# Patient Record
Sex: MALE | Race: WHITE | NOT HISPANIC OR LATINO | URBAN - METROPOLITAN AREA
[De-identification: names, ages, dates, MRNs, and addresses within clinical notes are randomized per-mention and may not be internally consistent; named-entity substitution may affect disease eponyms.]

---

## 2017-01-02 ENCOUNTER — COMMUNICATION - HEALTHEAST (OUTPATIENT)
Dept: FAMILY MEDICINE | Facility: CLINIC | Age: 82
End: 2017-01-02

## 2017-01-02 DIAGNOSIS — I10 ESSENTIAL HYPERTENSION: ICD-10-CM

## 2017-01-02 DIAGNOSIS — N28.9 DISORDER OF KIDNEY AND URETER: ICD-10-CM

## 2017-01-05 ENCOUNTER — COMMUNICATION - HEALTHEAST (OUTPATIENT)
Dept: FAMILY MEDICINE | Facility: CLINIC | Age: 82
End: 2017-01-05

## 2017-01-05 DIAGNOSIS — N28.9 DISORDER OF KIDNEY AND URETER: ICD-10-CM

## 2017-01-05 DIAGNOSIS — I10 ESSENTIAL HYPERTENSION: ICD-10-CM

## 2017-05-24 ENCOUNTER — OFFICE VISIT - HEALTHEAST (OUTPATIENT)
Dept: FAMILY MEDICINE | Facility: CLINIC | Age: 82
End: 2017-05-24

## 2017-05-24 DIAGNOSIS — J32.9 SINUSITIS: ICD-10-CM

## 2017-05-24 DIAGNOSIS — I10 ESSENTIAL HYPERTENSION: ICD-10-CM

## 2017-05-24 DIAGNOSIS — N28.9 DISORDER OF KIDNEY AND URETER: ICD-10-CM

## 2017-05-24 ASSESSMENT — MIFFLIN-ST. JEOR: SCORE: 1194.91

## 2017-06-01 ENCOUNTER — OFFICE VISIT - HEALTHEAST (OUTPATIENT)
Dept: FAMILY MEDICINE | Facility: CLINIC | Age: 82
End: 2017-06-01

## 2017-06-01 DIAGNOSIS — I10 ESSENTIAL HYPERTENSION WITH GOAL BLOOD PRESSURE LESS THAN 140/90: ICD-10-CM

## 2017-06-01 DIAGNOSIS — N28.9 DISORDER OF KIDNEY AND URETER: ICD-10-CM

## 2017-06-01 DIAGNOSIS — Z00.00 ROUTINE GENERAL MEDICAL EXAMINATION AT A HEALTH CARE FACILITY: ICD-10-CM

## 2017-06-01 DIAGNOSIS — J01.90 ACUTE SINUSITIS: ICD-10-CM

## 2017-06-01 DIAGNOSIS — G62.9 PERIPHERAL NEUROPATHY: ICD-10-CM

## 2017-06-19 ENCOUNTER — COMMUNICATION - HEALTHEAST (OUTPATIENT)
Dept: FAMILY MEDICINE | Facility: CLINIC | Age: 82
End: 2017-06-19

## 2017-06-19 DIAGNOSIS — N28.9 DISORDER OF KIDNEY AND URETER: ICD-10-CM

## 2017-06-19 DIAGNOSIS — I10 ESSENTIAL HYPERTENSION: ICD-10-CM

## 2017-06-21 ENCOUNTER — COMMUNICATION - HEALTHEAST (OUTPATIENT)
Dept: FAMILY MEDICINE | Facility: CLINIC | Age: 82
End: 2017-06-21

## 2017-06-21 DIAGNOSIS — I10 ESSENTIAL HYPERTENSION: ICD-10-CM

## 2017-06-21 DIAGNOSIS — N28.9 DISORDER OF KIDNEY AND URETER: ICD-10-CM

## 2017-07-06 ENCOUNTER — OFFICE VISIT - HEALTHEAST (OUTPATIENT)
Dept: FAMILY MEDICINE | Facility: CLINIC | Age: 82
End: 2017-07-06

## 2017-07-06 DIAGNOSIS — N28.9 DISORDER OF KIDNEY AND URETER: ICD-10-CM

## 2017-07-06 DIAGNOSIS — I10 ESSENTIAL HYPERTENSION WITH GOAL BLOOD PRESSURE LESS THAN 140/90: ICD-10-CM

## 2017-07-06 DIAGNOSIS — J30.9 ALLERGIC RHINITIS: ICD-10-CM

## 2017-07-28 ENCOUNTER — OFFICE VISIT - HEALTHEAST (OUTPATIENT)
Dept: FAMILY MEDICINE | Facility: CLINIC | Age: 82
End: 2017-07-28

## 2017-07-28 DIAGNOSIS — I10 ESSENTIAL HYPERTENSION WITH GOAL BLOOD PRESSURE LESS THAN 140/90: ICD-10-CM

## 2017-07-28 DIAGNOSIS — N40.0 BPH (BENIGN PROSTATIC HYPERPLASIA): ICD-10-CM

## 2017-07-28 DIAGNOSIS — N28.9 DISORDER OF KIDNEY AND URETER: ICD-10-CM

## 2017-08-07 ENCOUNTER — COMMUNICATION - HEALTHEAST (OUTPATIENT)
Dept: FAMILY MEDICINE | Facility: CLINIC | Age: 82
End: 2017-08-07

## 2017-08-07 DIAGNOSIS — I10 ESSENTIAL HYPERTENSION: ICD-10-CM

## 2017-08-07 DIAGNOSIS — N28.9 DISORDER OF KIDNEY AND URETER: ICD-10-CM

## 2018-09-05 ENCOUNTER — OFFICE VISIT - HEALTHEAST (OUTPATIENT)
Dept: FAMILY MEDICINE | Facility: CLINIC | Age: 83
End: 2018-09-05

## 2018-09-05 DIAGNOSIS — N40.0 BPH (BENIGN PROSTATIC HYPERPLASIA): ICD-10-CM

## 2018-09-05 DIAGNOSIS — Z00.00 ROUTINE GENERAL MEDICAL EXAMINATION AT A HEALTH CARE FACILITY: ICD-10-CM

## 2018-09-05 DIAGNOSIS — N28.9 DISORDER OF KIDNEY AND URETER: ICD-10-CM

## 2018-09-05 DIAGNOSIS — I10 ESSENTIAL HYPERTENSION WITH GOAL BLOOD PRESSURE LESS THAN 140/90: ICD-10-CM

## 2018-09-05 LAB
ALBUMIN SERPL-MCNC: 3.5 G/DL (ref 3.5–5)
ALP SERPL-CCNC: 93 U/L (ref 45–120)
ALT SERPL W P-5'-P-CCNC: 11 U/L (ref 0–45)
ANION GAP SERPL CALCULATED.3IONS-SCNC: 9 MMOL/L (ref 5–18)
AST SERPL W P-5'-P-CCNC: 14 U/L (ref 0–40)
BILIRUB SERPL-MCNC: 0.4 MG/DL (ref 0–1)
BUN SERPL-MCNC: 20 MG/DL (ref 8–28)
CALCIUM SERPL-MCNC: 8.9 MG/DL (ref 8.5–10.5)
CHLORIDE BLD-SCNC: 106 MMOL/L (ref 98–107)
CO2 SERPL-SCNC: 25 MMOL/L (ref 22–31)
CREAT SERPL-MCNC: 1.77 MG/DL (ref 0.7–1.3)
ERYTHROCYTE [DISTWIDTH] IN BLOOD BY AUTOMATED COUNT: 13.5 % (ref 11–14.5)
GFR SERPL CREATININE-BSD FRML MDRD: 36 ML/MIN/1.73M2
GLUCOSE BLD-MCNC: 72 MG/DL (ref 70–125)
HCT VFR BLD AUTO: 43.9 % (ref 40–54)
HGB BLD-MCNC: 14.2 G/DL (ref 14–18)
MCH RBC QN AUTO: 28.5 PG (ref 27–34)
MCHC RBC AUTO-ENTMCNC: 32.3 G/DL (ref 32–36)
MCV RBC AUTO: 88 FL (ref 80–100)
PLATELET # BLD AUTO: 303 THOU/UL (ref 140–440)
PMV BLD AUTO: 6.8 FL (ref 7–10)
POTASSIUM BLD-SCNC: 4.2 MMOL/L (ref 3.5–5)
PROT SERPL-MCNC: 7 G/DL (ref 6–8)
RBC # BLD AUTO: 4.98 MILL/UL (ref 4.4–6.2)
SODIUM SERPL-SCNC: 140 MMOL/L (ref 136–145)
WBC: 6.8 THOU/UL (ref 4–11)

## 2018-09-05 RX ORDER — ATENOLOL 25 MG/1
25 TABLET ORAL DAILY
Qty: 90 TABLET | Refills: 3 | Status: SHIPPED | OUTPATIENT
Start: 2018-09-05

## 2018-09-05 ASSESSMENT — MIFFLIN-ST. JEOR: SCORE: 1247.29

## 2018-09-06 ENCOUNTER — COMMUNICATION - HEALTHEAST (OUTPATIENT)
Dept: FAMILY MEDICINE | Facility: CLINIC | Age: 83
End: 2018-09-06

## 2018-09-28 ENCOUNTER — OFFICE VISIT - HEALTHEAST (OUTPATIENT)
Dept: FAMILY MEDICINE | Facility: CLINIC | Age: 83
End: 2018-09-28

## 2018-09-28 DIAGNOSIS — I10 ESSENTIAL HYPERTENSION: ICD-10-CM

## 2021-05-27 ENCOUNTER — RECORDS - HEALTHEAST (OUTPATIENT)
Dept: ADMINISTRATIVE | Facility: CLINIC | Age: 86
End: 2021-05-27

## 2021-05-31 VITALS — HEIGHT: 63 IN | WEIGHT: 145 LBS | BODY MASS INDEX: 25.69 KG/M2

## 2021-05-31 VITALS — WEIGHT: 146 LBS | BODY MASS INDEX: 26.28 KG/M2

## 2021-05-31 VITALS — BODY MASS INDEX: 26.28 KG/M2 | WEIGHT: 146 LBS

## 2021-05-31 VITALS — BODY MASS INDEX: 25.83 KG/M2 | WEIGHT: 143.5 LBS

## 2021-06-02 VITALS — WEIGHT: 153.05 LBS | BODY MASS INDEX: 26.13 KG/M2 | HEIGHT: 64 IN

## 2021-06-02 VITALS — BODY MASS INDEX: 26.68 KG/M2 | WEIGHT: 153 LBS

## 2021-06-10 NOTE — PROGRESS NOTES
Subjective:    Gustavo Montoya is a 88 y.o. male who presents for evaluation of chest and nasal congestion.  He has been not feeling well for 7 days.  He has chest congestion, runny nose, and significant nasal congestion.  He says sometimes it is quite difficult to breathe out of his nose.  Slight intermittent cough.  He has been taking Waldryl for the past several days.  Initially it helped a fair amount, now it is not helping much.  He has had some sneezing, mild headache.  No sinus or facial pain.  No fevers.  No known sick contacts.    He has some hearing loss.  His last visit was in 2015.  He reports he has been pretty healthy since then.  He is taking atenolol and Flomax.  He says he is actually taking atenolol twice a day, once at 4 AM and once at 9 AM.  He is due for follow-up visit with PCP for physical and medication management.    Patient Active Problem List   Diagnosis     Hyperlipidemia     Cataract     Transient Ischemic Attack     Acute Bronchitis     Eustachian Tube Dysfunction     Hearing Loss     Essential Hypertension     Renal Insufficiency     Benign Prostatic Hypertrophy     Palpitations     Backache       Current Outpatient Prescriptions:      aspirin 81 MG EC tablet, Take 1 tablet (81 mg total) by mouth daily., Disp: 100 tablet, Rfl: 2     atenolol (TENORMIN) 25 MG tablet, TAKE ONE TABLET BY MOUTH ONCE DAILY (Patient taking differently: TAKE ONE TABLET BY MOUTH TWICE DAILY), Disp: 90 tablet, Rfl: 0     amoxicillin (AMOXIL) 500 MG tablet, Take 1 tablet (500 mg total) by mouth 2 (two) times a day for 10 days., Disp: 20 tablet, Rfl: 0     tamsulosin (FLOMAX) 0.4 mg Cp24, Take 1 capsule (0.4 mg total) by mouth daily after supper., Disp: 90 capsule, Rfl: 3     Objective:   Allergies:  Review of patient's allergies indicates no known allergies.    Vitals:  Vitals:    05/24/17 1402   BP: 130/64   Pulse: 66   Resp: 22   Temp: 97.8  F (36.6  C)     Body mass index is 26.1 kg/(m^2).    Vital signs  reviewed.  General: Patient is alert and oriented x 3, in no apparent distress  Ears: TMs are non-erythematous with good light reflex bilaterally  Throat: no erythema, edema or exudate noted  Lymphatic: no anterior cervical lymph node enlargement  Cardiac: regular rate and rhythm, no murmurs  Pulmonary: lungs clear to auscultation bilaterally, no crackles, rales, rhonchi, or wheezing noted    Results for orders placed or performed in visit on 05/24/17   HM2(CBC w/o Differential)   Result Value Ref Range    WBC 9.2 4.0 - 11.0 thou/uL    RBC 4.77 4.40 - 6.20 mill/uL    Hemoglobin 13.9 (L) 14.0 - 18.0 g/dL    Hematocrit 41.6 40.0 - 54.0 %    MCV 87 80 - 100 fL    MCH 29.1 27.0 - 34.0 pg    MCHC 33.4 32.0 - 36.0 g/dL    RDW 12.4 11.0 - 14.5 %    Platelets 357 140 - 440 thou/uL    MPV 7.3 7.0 - 10.0 fL    BMP pending.    Assessment and Plan:   1.  Sinusitis.  Prescription sent for amoxicillin, 500 mg twice daily for 10 days.  He has a history of some renal insufficiency.  Last GFR from 10/20/2015 was 36.  As a precaution, I will use renal dosing of amoxicillin as stated above.  I recommended he try to wean off the Waldryl and he will do this.  He will continue to monitor symptoms and keep us informed as to how he is doing.    2.  Healthcare maintenance.  He has a physical scheduled with Dr. Kiser next week.    This dictation uses voice recognition software, which may contain typographical errors.

## 2021-06-11 NOTE — PROGRESS NOTES
Hm  Seen recently for sinusitis  follow up chronic issues  New concern wt loss    Allergies nasal dripping and coughing phlegm.  No fever.  Breathe is okay.  Cannot lay with head back as it causes cough.  Needs to lean forward.  12 days.   Could only lay down for 3 hours as the phlegm builds up.  It tickles.  Aggravating.    Sinuses are congested and drippin.   Ears are alright.    On asa and hypertension med.  No other pills.     No bleeding  Normal stools  Nl urine    Was given amox.  Took three pills.  It loosened things up but it leaves you so high strung.  Up tight.  Hard to explain.  Oh my god.  Hard to breathe.  Then i'd cough.    Lost my pot belly due to poor appetite due to above.  Did not eat for five days.    Past hx and fh and fhsh same.  Still gigs 2/wk.  Will stand for hours    Hypertension denies chest pain or headache.  Renal insufficiency denies nausea or vomiting        ROS:  Constitutional: denies fever  Vision: denies change in vision  ENT: above cough  Resp: denies shortness of breath  Card: denies palpitations  GI: denies vomiting or abnormal stool, melena, hematochezia  : denies dysuria  Neuro: denies weakness   intermittent left medial forefoot numbness with prolong standing at gigs.  Some sx at night after long gig  Derm: denies rash  Joints: denies redness or swelling  Endo: denies polyuria  Mental health: mood is good  Extremities: no edema  Otherwise negative review of systems     ROS: as noted above    OBJECTIVE:   Vitals:    06/01/17 1411   BP: 144/74   Pulse: 60   Resp: 16   Temp: 97.4  F (36.3  C)      Head: atraumatic   Eyes: nl eom, anicteric   Ears: nl external ears tms    Pt [place]  Neck: nl nodes, supple   Lungs: clear to ausc   Heart: regular rhythm  Back: no tenderness  Abd: soft nontender   Joints: uninflamed   Ext: nontender calves   Mental: euthymic  Neuro: no weakness  Gait: slow rising and gait.  Independent without device      ASSESSMENT/PLAN:   Health Maintenance/ Plan:  anticipatory guidance, discussion of risk factors, lifestyle modification, and risk factor management. For children age 12 months to adulthood verbal dental referral is given and discussed benefits and risks of FVA and obtained verbal with each application.    Additional diagnoses and related orders:  1. Routine general medical examination at a health care facility     2. Acute sinusitis  fluticasone (FLONASE) 50 mcg/actuation nasal spray   3. Essential hypertension with goal blood pressure less than 140/90     4. Renal Insufficiency     5. 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  Labs done recently  follow up couple wks for wt, bp, cough, foot sxs

## 2021-06-11 NOTE — PROGRESS NOTES
Hypertension denies chest pain or headache.  Says taking meds.    Here follow up wt.  His appetite is back and his pot belly is back.    Renal insufficiency denies nausea or vomiting    [place] [facility] [place] [facility]    Sx resolved from last visit except the numbness which is improved    Allergy drip and cough resolved with nasal steroid    ROS: as noted above    OBJECTIVE:   Vitals:    07/06/17 1345   BP: 152/76   Pulse: (!) 55   Resp: 20   Temp: 97.5  F (36.4  C)   SpO2: 97%      Eyes: non icteric, noninflamed  Lungs: no resp distress  Heart: regular  Ankles: no edema  Muscles: nontender  Mental status: euthymic.  Happy and excited  Neuro: nonfocal  Gait spry and energetic    ASSESSMENT/PLAN:    1. Allergic rhinitis     2. Essential hypertension with goal blood pressure less than 140/90  atenolol (TENORMIN) 25 MG tablet   3. Disorder of kidney and ureter  atenolol (TENORMIN) 25 MG tablet     Uncontrolled Hypertension/ double atenolol to 25 two am and follow up two wks.  Chronic issues stable/ same treatment.

## 2021-06-12 NOTE — PROGRESS NOTES
Hypertension denies chest pain or headache.  Increased to bid.  Doing well.  Occasional night at 3 am hears heart beat for 20 minutes.  Then better.  Intermittent    Denies chest pain, light headed.  The rate is not fast or slow.  No light headed.  Goes to urinate and then back to bed.  Sometimes keeps him up    bph Stream stable    Renal insufficiency denies nausea or vomiting      ROS: as noted above    OBJECTIVE:   Vitals:    07/28/17 1111   BP: 124/62   Pulse: (!) 56   Resp: 20      Eyes: non icteric, noninflamed  Lungs: no resp distress  Heart: regular  Ankles: no edema  Muscles: nontender  Mental status: euthymic  Neuro: nonfocal  ASSESSMENT/PLAN:    Additional diagnoses and related orders:  1. Renal Insufficiency     2. BPH (benign prostatic hyperplasia)     3. Essential hypertension with goal blood pressure less than 140/90       Try taking atenolol bid instead of 2 am  follow up three months or earlier per sx  Chronic issues stable/ same treatment.

## 2021-06-20 NOTE — PROGRESS NOTES
"Annual wellness visit    Has noticed memory issues.  This the only issue on the questionnaire needing to be addressed.   Along with hearing aid already addressed  Tells story of getting gas for car and forgetting the milk when asked to get bananas and milk.    Forgot twice.  Remembers the story of what happened yesterday.  \"how dumb can you get?\"    Does remember what he had for breakfast.    Lives with wife.  Second wife.   Was with her 17 years prior to marriage as her six kids were growing up.   After the kids gone, then .    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  But stopped his bp med     Was on atenolol 50/ d but with sxs.    ROS:  Constitutional: denies fever  Vision: denies change in vision  ENT: denies cough or congestion  Thyroid: denies unusual fatigue  Resp: denies shortness of breath  Card: denies palpitations  GI: denies vomiting or abnormal stool, melena, hematochezia  : denies dysuria     2/night nocturia  Neuro: denies numbness or weakness  Derm: denies rash  Joints: denies redness or swelling  Endo: denies polyuria  Mental health: mood is good  Extremities: no edema  Mobility: stable    occ tremor    Runs a snowblower.  Needs to stop after a block due to out of breath  [place] but needed to sit after every block.   Was there for 3 hours.  It's just a glorified restaurant.    Renal insufficiency denies nausea or vomiting    Has hearing aids.  The cheaper version  Renal insufficiency denies nausea or vomiting    Otherwise negative review of systems    ROS: as noted above    OBJECTIVE:   Vitals:    09/05/18 1328   BP: 140/90   Pulse: 92   Resp: 20   Temp: 97.4  F (36.3  C)   SpO2: 96%      Wt is noted.  Up a few pounds  No diaphoresis  Eyes: nl eom, anicteric   External ears, nose: nl    Neck: nl nodes, supple, thyroid " normal   Lungs: clear to ausc   Heart: regular rhythm  Abd: soft nontender     No cva (renal) tenderness  Neuro: no weakness  Skin no rash  Joints: uninflamed   No ketotic breath odor noted  Mental: euthymic  Ext: nontender calves   Gait: normal    Body mass index is 26.69 kg/(m^2).     ASSESSMENT/PLAN:   Health Maintenance/ Plan: anticipatory guidance, discussion of risk factors, lifestyle modification, and risk factor management.     Hypertension poor control/ resume atenolol 25/d   If not goal add amlodipine vs increase bblocker    follow up 3 wks  Additional diagnoses and related orders:  1. BPH (benign prostatic hyperplasia)     2. Essential hypertension with goal blood pressure less than 140/90  atenolol (TENORMIN) 25 MG tablet   3. Disorder of kidney and ureter  atenolol (TENORMIN) 25 MG tablet    HM2(CBC w/o Differential)    Comprehensive Metabolic Panel   4. Routine general medical examination at a health care facility       Care team    pcp Dr Kiser

## 2021-06-20 NOTE — PROGRESS NOTES
Hypertension denies chest pain or headache.  follow up as last high    Pt without sx  Good spirits       OBJECTIVE:   Vitals:    09/28/18 1342   BP: 112/64   Pulse: 66      Eyes: non icteric, noninflamed  Lungs: no resp distress  Heart: regular  Ankles: no edema  Muscles: nontender  Mental status: euthymic  Neuro: nonfocal    Body mass index is 26.68 kg/(m^2).   ASSESSMENT/PLAN:    Additional diagnoses and related orders:  1. Essential hypertension       More than 10 of fifteen total minutes time spent education counseling regarding the issues and care of same as listed in the assessment and plan of this note   follow up few months

## 2025-01-11 ENCOUNTER — LAB REQUISITION (OUTPATIENT)
Dept: LAB | Facility: CLINIC | Age: OVER 89
End: 2025-01-11

## 2025-01-11 LAB — C DIFF TOX B STL QL: NEGATIVE

## 2025-01-11 PROCEDURE — 87493 C DIFF AMPLIFIED PROBE: CPT

## 2025-01-20 ENCOUNTER — LAB REQUISITION (OUTPATIENT)
Dept: LAB | Facility: CLINIC | Age: OVER 89
End: 2025-01-20

## 2025-01-20 DIAGNOSIS — N39.0 URINARY TRACT INFECTION, SITE NOT SPECIFIED: ICD-10-CM
